# Patient Record
Sex: MALE | Race: WHITE | NOT HISPANIC OR LATINO
[De-identification: names, ages, dates, MRNs, and addresses within clinical notes are randomized per-mention and may not be internally consistent; named-entity substitution may affect disease eponyms.]

---

## 2024-11-05 ENCOUNTER — DASHBOARD ENCOUNTERS (OUTPATIENT)
Age: 69
End: 2024-11-05

## 2024-11-05 ENCOUNTER — OFFICE VISIT (OUTPATIENT)
Dept: URBAN - METROPOLITAN AREA CLINIC 23 | Facility: CLINIC | Age: 69
End: 2024-11-05
Payer: MEDICARE

## 2024-11-05 VITALS
SYSTOLIC BLOOD PRESSURE: 127 MMHG | DIASTOLIC BLOOD PRESSURE: 82 MMHG | TEMPERATURE: 98.2 F | HEART RATE: 85 BPM | HEIGHT: 72 IN | BODY MASS INDEX: 33.59 KG/M2 | WEIGHT: 248 LBS

## 2024-11-05 DIAGNOSIS — K52.9 ACUTE GASTROENTERITIS: ICD-10-CM

## 2024-11-05 DIAGNOSIS — K64.8 INTERNAL HEMORRHOIDS: ICD-10-CM

## 2024-11-05 DIAGNOSIS — K59.09 CHRONIC CONSTIPATION: ICD-10-CM

## 2024-11-05 PROBLEM — 236069009: Status: ACTIVE | Noted: 2024-11-05

## 2024-11-05 PROBLEM — 90458007: Status: ACTIVE | Noted: 2024-11-05

## 2024-11-05 PROCEDURE — 99204 OFFICE O/P NEW MOD 45 MIN: CPT | Performed by: STUDENT IN AN ORGANIZED HEALTH CARE EDUCATION/TRAINING PROGRAM

## 2024-11-05 NOTE — HPI-TODAY'S VISIT:
- Reason for consultation: Hemorrhoids and constipation - Chief complaint: Hemorrhoid causing discomfort and bleeding - History of present illness: - Long-standing issues with constipation - Hemorrhoid protruding during bowel movements, then retracting - Significant bleeding noted, with water appearing "pretty red" - Recent episode of diarrhea for 3 days (Saturday, Sunday, and Monday), now resolved - Abdominal pain prior to diarrhea episode, now resolved - Using Preparation H topical cream with some improvement - Last bowel movement had minimal blood - Past medical history: - History of polyps on colonoscopy 5 years prior to 2020 - Colonoscopy in December 2020 showed no polyps, recommended 10-year follow-up - Current medications: - Miralax (as needed for constipation) - Preparation H (topical cream for hemorrhoids) - Social history: - Retired - Recent shoulder surgery in April

## 2024-12-09 ENCOUNTER — OFFICE VISIT (OUTPATIENT)
Dept: URBAN - METROPOLITAN AREA CLINIC 23 | Facility: CLINIC | Age: 69
End: 2024-12-09
Payer: MEDICARE

## 2024-12-09 VITALS
HEIGHT: 72 IN | SYSTOLIC BLOOD PRESSURE: 162 MMHG | HEART RATE: 118 BPM | WEIGHT: 253 LBS | BODY MASS INDEX: 34.27 KG/M2 | DIASTOLIC BLOOD PRESSURE: 82 MMHG | TEMPERATURE: 98.4 F

## 2024-12-09 DIAGNOSIS — K52.9 ACUTE GASTROENTERITIS: ICD-10-CM

## 2024-12-09 DIAGNOSIS — K59.09 CHRONIC CONSTIPATION: ICD-10-CM

## 2024-12-09 DIAGNOSIS — K64.8 INTERNAL HEMORRHOIDS: ICD-10-CM

## 2024-12-09 PROCEDURE — 99213 OFFICE O/P EST LOW 20 MIN: CPT | Performed by: STUDENT IN AN ORGANIZED HEALTH CARE EDUCATION/TRAINING PROGRAM

## 2024-12-09 NOTE — HPI-MIGRATED HPI
11/2024 OV  - Reason for consultation: Hemorrhoids and constipation - Chief complaint: Hemorrhoid causing discomfort and bleeding - History of present illness: - Long-standing issues with constipation - Hemorrhoid protruding during bowel movements, then retracting - Significant bleeding noted, with water appearing "pretty red" - Recent episode of diarrhea for 3 days (Saturday, Sunday, and Monday), now resolved - Abdominal pain prior to diarrhea episode, now resolved - Using Preparation H topical cream with some improvement - Last bowel movement had minimal blood - Past medical history: - History of polyps on colonoscopy 5 years prior to 2020 - Colonoscopy in December 2020 showed no polyps, recommended 10-year follow-up - Current medications: - Miralax (as needed for constipation) - Preparation H (topical cream for hemorrhoids) - Social history: - Retired - Recent shoulder surgery in April

## 2024-12-09 NOTE — HPI-TODAY'S VISIT:
- Follow-up visit for hemorrhoids, previously treated with Metamucil. - Reports significant improvement in symptoms: bleeding resolved approximately 2 weeks ago, hemorrhoidal protrusion no longer present. - Continues to experience mild constipation, which has been a chronic issue. - Bowel movements occurring daily to every other day. - Current medications include meloxicam for recent foot injury. - Reports experiencing some gastrointestinal upset with meloxicam, mitigated by taking with food. - Stools have returned to normal color from previously darker appearance. - Currently taking Metamucil 2 scoops once daily in the morning, reporting improved overall digestive health.

## 2024-12-09 NOTE — PHYSICAL EXAM CONSTITUTIONAL:
alert, in no acute distress, well developed, well nourished
[Family History Reviewed and Updated] : family history reviewed and updated
[Family History Reviewed and Updated] : family history reviewed and updated